# Patient Record
Sex: FEMALE | Race: WHITE | NOT HISPANIC OR LATINO
[De-identification: names, ages, dates, MRNs, and addresses within clinical notes are randomized per-mention and may not be internally consistent; named-entity substitution may affect disease eponyms.]

---

## 2020-03-04 ENCOUNTER — APPOINTMENT (OUTPATIENT)
Dept: ORTHOPEDIC SURGERY | Facility: CLINIC | Age: 85
End: 2020-03-04
Payer: MEDICARE

## 2020-03-04 VITALS
BODY MASS INDEX: 28.17 KG/M2 | HEART RATE: 91 BPM | SYSTOLIC BLOOD PRESSURE: 127 MMHG | HEIGHT: 64 IN | DIASTOLIC BLOOD PRESSURE: 67 MMHG | WEIGHT: 165 LBS | TEMPERATURE: 98.1 F

## 2020-03-04 DIAGNOSIS — Z86.73 PERSONAL HISTORY OF TRANSIENT ISCHEMIC ATTACK (TIA), AND CEREBRAL INFARCTION W/OUT RESIDUAL DEFICITS: ICD-10-CM

## 2020-03-04 DIAGNOSIS — Z96.649 PERIPROSTHETIC FRACTURE AROUND OTHER INTERNAL PROSTHETIC JOINT, INITIAL ENCOUNTER: ICD-10-CM

## 2020-03-04 DIAGNOSIS — F03.90 UNSPECIFIED DEMENTIA W/OUT BEHAVIORAL DISTURBANCE: ICD-10-CM

## 2020-03-04 DIAGNOSIS — S72.142A DISPLACED INTERTROCHANTERIC FRACTURE OF LEFT FEMUR, INITIAL ENCOUNTER FOR CLOSED FRACTURE: ICD-10-CM

## 2020-03-04 DIAGNOSIS — S72.002A FRACTURE OF UNSPECIFIED PART OF NECK OF LEFT FEMUR, INITIAL ENCOUNTER FOR CLOSED FRACTURE: ICD-10-CM

## 2020-03-04 DIAGNOSIS — M97.8XXA PERIPROSTHETIC FRACTURE AROUND OTHER INTERNAL PROSTHETIC JOINT, INITIAL ENCOUNTER: ICD-10-CM

## 2020-03-04 PROCEDURE — 99204 OFFICE O/P NEW MOD 45 MIN: CPT | Mod: 57

## 2020-03-04 PROCEDURE — 27238 TREAT THIGH FRACTURE: CPT | Mod: LT

## 2020-03-04 NOTE — HISTORY OF PRESENT ILLNESS
[de-identified] : The patient is a 87-year-old female presents with family today for evaluation of left hip pain. She is here as a second opinion from [facility]. 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 She suffered a fall. A greater trochanter fracture was diagnosed. Initial plan was to treat her nonoperatively however the fracture continued to displace and she was referred here as a second opinion. she has significant dementia at baseline. She has pain reportedly with ambulation. She is unable to provide much history herself and the family gives the medical history and history of present illness. The patient states the pain is made worse with activity and relieved with rest. rosa, 4/10 [Bending] : worsened by bending [Lifting] : worsened by lifting [Recumbency] : relieved by recumbency [Rest] : relieved by rest

## 2020-03-04 NOTE — REASON FOR VISIT
[Initial Visit] : an initial visit for [Family Member] : family member [Formal Caregiver] : formal caregiver [FreeTextEntry2] : Left hip pain

## 2020-03-04 NOTE — PHYSICAL EXAM
[de-identified] : Physical Exam:\par General: Well appearing, no acute distress, A&O\par Neurologic: A&Ox3, No focal deficits\par Head: NCAT without abrasions, lacerations, or ecchymosis to head, face, or scalp\par Respiratory: Equal chest wall expansion bilaterally, no accessory muscle use\par Lymphatic: No lymphadenopathy palpated\par Skin: Warm and dry\par Psychiatric: Normal mood and affect\par \par LLE:\par SILT s/s/sp/dp/t\par Fires EHL/FHL/GS/TA\par 2+ DP/PT pulse\par brisk capillary refill\par Minimal pain with left hip log roll [de-identified] : the patient brings outside plain films of the left femur. A show a total hip arthroplasty with a long stem that extends to the distal femur. There is a displaced greater trochanter fracture.

## 2020-03-04 NOTE — DISCUSSION/SUMMARY
[de-identified] : 87-year-old female with underlying dementia and left greater trochanteric hip fracture in the setting of a total hip arthroplasty. We discussed the long stem is likely providing some stability for the total hip but it is unclear on the imaging available today if there is any ongrowth or ingrowth into the stem. i would recommend a CT scan for further evaluation. In the interim I would allow the patient to be weightbearing as tolerated due to her advanced age and limited mobility.We discussed that simply fixing the fracture would not be enough to treat this injury and she would likely require revision hip arthroplasty which is an extensive surgery likely requiring several hours in the operating room with significant blood loss. The family does not wish to proceed down this route unless they have to. we'll obtain a CT scan and they will call us to discuss the results. Hopefully the fracture will be able to be treated nonoperatively. The patient's family given the opportunity to ask questions and all questions were answered to their satisfaction.\par \par Osteopenia and osteoporosis are significant risk factors for fragility fractures. Given the type of fracture that has occurred, I would highly recommend that the patient followup with their primary care physician to discuss treatment for low bone mineral density. We discussed the benefits of these medications frequently far outweigh the small risks. Their primary care physician can call the office with any specific questions or concerns.\par \par Francisco Walker MD\par Orthopaedic Trauma Surgeon\par [facility] [place]\par Director Orthopaedic Trauma, [facility]\par \par \par \par \par